# Patient Record
Sex: MALE | Race: WHITE | NOT HISPANIC OR LATINO | Employment: FULL TIME | URBAN - METROPOLITAN AREA
[De-identification: names, ages, dates, MRNs, and addresses within clinical notes are randomized per-mention and may not be internally consistent; named-entity substitution may affect disease eponyms.]

---

## 2022-02-10 ENCOUNTER — TELEPHONE (OUTPATIENT)
Dept: SPORTS MEDICINE | Facility: CLINIC | Age: 37
End: 2022-02-10

## 2022-02-10 ENCOUNTER — HOSPITAL ENCOUNTER (EMERGENCY)
Facility: HOSPITAL | Age: 37
Discharge: HOME | End: 2022-02-10
Attending: EMERGENCY MEDICINE
Payer: COMMERCIAL

## 2022-02-10 ENCOUNTER — TELEPHONE (OUTPATIENT)
Dept: ORTHOPEDICS | Facility: CLINIC | Age: 37
End: 2022-02-10

## 2022-02-10 VITALS
SYSTOLIC BLOOD PRESSURE: 140 MMHG | HEART RATE: 71 BPM | DIASTOLIC BLOOD PRESSURE: 74 MMHG | TEMPERATURE: 98 F | RESPIRATION RATE: 17 BRPM | OXYGEN SATURATION: 99 % | HEIGHT: 68 IN | WEIGHT: 192.56 LBS | BODY MASS INDEX: 29.18 KG/M2

## 2022-02-10 DIAGNOSIS — S62.621B OPEN DISPLACED FRACTURE OF MIDDLE PHALANX OF LEFT INDEX FINGER, INITIAL ENCOUNTER: Primary | ICD-10-CM

## 2022-02-10 PROCEDURE — 90471 IMMUNIZATION ADMIN: CPT | Mod: ER | Performed by: EMERGENCY MEDICINE

## 2022-02-10 PROCEDURE — 12002 RPR S/N/AX/GEN/TRNK2.6-7.5CM: CPT | Mod: ER

## 2022-02-10 PROCEDURE — 25000003 PHARM REV CODE 250: Mod: ER | Performed by: EMERGENCY MEDICINE

## 2022-02-10 PROCEDURE — 63600175 PHARM REV CODE 636 W HCPCS: Mod: ER | Performed by: EMERGENCY MEDICINE

## 2022-02-10 PROCEDURE — 96372 THER/PROPH/DIAG INJ SC/IM: CPT | Mod: ER

## 2022-02-10 PROCEDURE — 90715 TDAP VACCINE 7 YRS/> IM: CPT | Mod: ER | Performed by: EMERGENCY MEDICINE

## 2022-02-10 PROCEDURE — 99284 EMERGENCY DEPT VISIT MOD MDM: CPT | Mod: 25,ER

## 2022-02-10 RX ORDER — LIDOCAINE HYDROCHLORIDE 10 MG/ML
5 INJECTION, SOLUTION EPIDURAL; INFILTRATION; INTRACAUDAL; PERINEURAL
Status: DISCONTINUED | OUTPATIENT
Start: 2022-02-10 | End: 2022-02-10

## 2022-02-10 RX ORDER — LIDOCAINE HYDROCHLORIDE 10 MG/ML
5 INJECTION, SOLUTION EPIDURAL; INFILTRATION; INTRACAUDAL; PERINEURAL
Status: COMPLETED | OUTPATIENT
Start: 2022-02-10 | End: 2022-02-10

## 2022-02-10 RX ORDER — CEFAZOLIN SODIUM 1 G/3ML
2 INJECTION, POWDER, FOR SOLUTION INTRAMUSCULAR; INTRAVENOUS
Status: COMPLETED | OUTPATIENT
Start: 2022-02-10 | End: 2022-02-10

## 2022-02-10 RX ORDER — CEPHALEXIN 500 MG/1
500 CAPSULE ORAL EVERY 6 HOURS
Qty: 28 CAPSULE | Refills: 0 | Status: SHIPPED | OUTPATIENT
Start: 2022-02-10 | End: 2022-02-17

## 2022-02-10 RX ADMIN — TETANUS TOXOID, REDUCED DIPHTHERIA TOXOID AND ACELLULAR PERTUSSIS VACCINE, ADSORBED 0.5 ML: 5; 2.5; 8; 8; 2.5 SUSPENSION INTRAMUSCULAR at 10:02

## 2022-02-10 RX ADMIN — LIDOCAINE HYDROCHLORIDE 50 MG: 10 INJECTION, SOLUTION EPIDURAL; INFILTRATION; INTRACAUDAL at 10:02

## 2022-02-10 RX ADMIN — CEFAZOLIN 2 G: 330 INJECTION, POWDER, FOR SOLUTION INTRAMUSCULAR; INTRAVENOUS at 01:02

## 2022-02-10 RX ADMIN — BACITRACIN ZINC, NEOMYCIN SULFATE, AND POLYMYXIN B SULFATE: 400; 3.5; 5 OINTMENT TOPICAL at 01:02

## 2022-02-10 NOTE — TELEPHONE ENCOUNTER
----- Message from Kat Siddiqui sent at 2/10/2022  2:35 PM CST -----  States he needs to schedule an ER f/u for ortho. He was seen in the ER today. Please call pt [phone]. Thank you

## 2022-02-10 NOTE — TELEPHONE ENCOUNTER
Attempted to schedule patient for office visit for new fracture. Patient stated he already made an appt for tomorrow morning at the [facility]. Informed the patient to send a message or give us a call if he had any further questions. Patient accepted and verbalized understanding.

## 2022-02-10 NOTE — ED PROVIDER NOTES
Encounter Date: 2/10/2022       History     Chief Complaint   Patient presents with    Finger laceration     Left pointer finger pt has lacerated, pt's finger is white, pt states he has feeling, but it feels cold; pt states about 3000lb came down on finger     Patient is a 36-year-old male who presents today with complaints of acute onset left index finger pain.  He states that 3000 lb object fell on to that finger.  He cannot recall the last date of his tetanus vaccine.  He states it is the occurred about 10-15 minutes prior to arrival.  Patient states that his fingertip feels slightly cold, but he denies a loss of sensation.  No prior evaluation.  No prior treatment.        Review of patient's allergies indicates:  No Known Allergies  No past medical history on file.  No past surgical history on file.  No family history on file.     Review of Systems     Constitutional: No fevers, no fatigue  HENT: No headache, no facial pain, no hearing loss  Eyes: No vision changes, no eye pain  Neck/Back: No neck pain, no back pain  Cardiovascular: No chest pain, no palpitations, no syncope  Respiratory: no SOB, no cough  Abdominal: no abdominal pain, no N/V  Genitourinary: no pelvic pain, no genital pain  Musculoskeletal: left index finger pain  Neurological: No numbness, no paresthesias, no weakness, no LOC      Physical Exam     Initial Vitals [02/10/22 1003]   BP Pulse Resp Temp SpO2   133/75 90 16 97.9 °F (36.6 °C) 98 %      MAP       --         Physical Exam   Constitutional: Awake, alert, NAD  HENT: normocephalic, no facial bone tenderness, no evidence of basilar skull fx  Eyes: PERRL, EOM, normal conjunctiva  Neck: Trachea midline, nontender, full ROM  Cardiovascular: RRR, 2+ palpable pulses in all 4 extremities  Pulmonary: Non-labored respirations, equal bilateral breath sounds, LCTAB  Chest Wall: No tenderness, no deformity  Abdominal: Soft, nontender, nondistended  Back: Nontender, no step-offs  Musculoskeletal: U  shaped laceration involving the volar and lateral edges of the left index finger near the PIP joint.  not quite circumferential.  The dorsal aspect of the finger is spared. Bone exposed. Deformity and angulation to the finger.  Patient does have limited extension and flexion of the distal finger.  Capillary refill is 2 seconds in patient does have sensation at the distal finger tip.  Neurological: AAO x4, GCS 15, maintaining airway and answering questions appropriately, no focal deficits  Skin:  Black oil stains to hands bilaterally     ED Course   Lac Repair    Date/Time: 2/10/2022 3:20 PM  Performed by: Ari Rae MD  Authorized by: Ari Rae MD     Consent:     Consent obtained:  Verbal    Consent given by:  Patient    Risks, benefits, and alternatives were discussed: yes      Risks discussed:  Infection, need for additional repair, nerve damage, poor wound healing, poor cosmetic result, pain and tendon damage    Alternatives discussed:  No treatment  Universal protocol:     Procedure explained and questions answered to patient or proxy's satisfaction: yes      Relevant documents present and verified: yes      Patient identity confirmed:  Verbally with patient, arm band and hospital-assigned identification number  Anesthesia:     Anesthesia method:  Nerve block    Block location:  Left index finger    Block needle gauge:  27 G    Block anesthetic:  Lidocaine 1% w/o epi    Block injection procedure:  Anatomic landmarks identified  Laceration details:     Location:  Finger    Finger location:  L index finger    Wound length (cm): 4.  Pre-procedure details:     Preparation:  Patient was prepped and draped in usual sterile fashion  Exploration:     Imaging obtained: x-ray      Imaging outcome: foreign body not noted      Wound exploration: wound explored through full range of motion and entire depth of wound visualized      Wound extent: nerve damage (possible), tendon damage (possible) and underlying  fracture      Wound extent: no foreign bodies/material noted      Tendon damage location:  Upper extremity    Tendon repair plan:  Refer for evaluation    Contaminated: yes    Treatment:     Area cleansed with:  Povidone-iodine, saline and soap and water    Amount of cleaning:  Extensive    Irrigation solution:  Sterile water    Irrigation method:  Pressure wash    Visualized foreign bodies/material removed: no      Debridement:  None    Undermining:  None    Scar revision: no    Skin repair:     Repair method:  Sutures    Suture size:  5-0    Suture material:  Prolene    Suture technique:  Simple interrupted    Number of sutures: 9.  Approximation:     Approximation:  Close  Repair type:     Repair type:  Simple  Post-procedure details:     Dressing:  Antibiotic ointment    Procedure completion:  Tolerated well, no immediate complications      Labs Reviewed - No data to display       Imaging Results          X-Ray Finger 2 or More Views Left (Final result)  Result time 02/10/22 10:44:05    Final result by Shar Mckeon MD (02/10/22 10:44:05)                 Impression:      1.  Comminuted, displaced and angulated 2nd middle phalanx fracture.      Electronically signed by: Shar Mckeon MD  Date:    02/10/2022  Time:    10:44             Narrative:    EXAMINATION:  XR FINGER 2 OR MORE VIEWS LEFT    CLINICAL HISTORY:  crush injury;    TECHNIQUE:  AP, oblique and lateral views of the left 2nd finger    COMPARISON:  None    FINDINGS:  Comminuted and displaced fracture of the 2nd distal phalanx with apex dorsal angulation.  Probable avulsion fracture along the volar and medial proximal interphalangeal joint.    No other osseous or soft tissue abnormalities are seen.                                 Medications   Tdap (BOOSTRIX) vaccine injection 0.5 mL (0.5 mLs Intramuscular Given 2/10/22 1051)   LIDOcaine (PF) 10 mg/ml (1%) injection 50 mg (50 mg Infiltration Given 2/10/22 1057)   ceFAZolin injection 2 g (2 g  Intramuscular Given 2/10/22 1350)   neomycin-bacitracin-polymyxin ointment ( Topical (Top) Given 2/10/22 1352)     Medical Decision Making:   Significant injury with an open fracture to left index finger.  Possible tendon and nerve injury.  Discussed the case with Orthopedics on-call Dr. Contreras who advised that we sutured the wound, reduce it, splint it, and have it follow-up tomorrow morning in orthopedic clinic to discuss surgery for definitive repair.  He advises Keflex as the outpatient antibiotic.    Patient does report having oil stains to bilateral hands.  Difficult to remove all of the oil stains from the hands but wound and finger was cleaned extensively and cleaned as best as possible.  fracture was reduced and splinted.  Ancef and tetanus was given.  Keflex prescription provided and patient is to follow-up with orthopedics tomorrow to discuss surgery                          Clinical Impression:   Final diagnoses:  [O81.354Y] Open displaced fracture of middle phalanx of left index finger, initial encounter (Primary)          ED Disposition Condition    Discharge Stable        ED Prescriptions     Medication Sig Dispense Start Date End Date Auth. Provider    cephALEXin (KEFLEX) 500 MG capsule Take 1 capsule (500 mg total) by mouth every 6 (six) hours. for 7 days 28 capsule 2/10/2022 2/17/2022 Ari Rae MD        Follow-up Information     Follow up With Specialties Details Why Contact [facility]  In 1 day  [address] [place] [address] [address]  [facility] [address]  [phone]      [facility] - Emergency Dept Emergency Medicine  As needed, If symptoms worsen [id number] [facility] [address]  [facility] [address] [phone]           Ari Rae MD  02/10/22 5745

## 2022-02-11 ENCOUNTER — OFFICE VISIT (OUTPATIENT)
Dept: ORTHOPEDICS | Facility: CLINIC | Age: 37
End: 2022-02-11
Payer: COMMERCIAL

## 2022-02-11 ENCOUNTER — ANESTHESIA (OUTPATIENT)
Dept: SURGERY | Facility: HOSPITAL | Age: 37
End: 2022-02-11
Payer: COMMERCIAL

## 2022-02-11 ENCOUNTER — ANESTHESIA EVENT (OUTPATIENT)
Dept: SURGERY | Facility: HOSPITAL | Age: 37
End: 2022-02-11
Payer: COMMERCIAL

## 2022-02-11 ENCOUNTER — HOSPITAL ENCOUNTER (OUTPATIENT)
Facility: HOSPITAL | Age: 37
Discharge: HOME | End: 2022-02-11
Attending: ORTHOPAEDIC SURGERY | Admitting: ORTHOPAEDIC SURGERY
Payer: COMMERCIAL

## 2022-02-11 VITALS
HEART RATE: 88 BPM | TEMPERATURE: 99 F | SYSTOLIC BLOOD PRESSURE: 135 MMHG | WEIGHT: 192.69 LBS | HEIGHT: 68 IN | DIASTOLIC BLOOD PRESSURE: 79 MMHG | BODY MASS INDEX: 29.2 KG/M2

## 2022-02-11 DIAGNOSIS — S62.621D OPEN DISPLACED FRACTURE OF MIDDLE PHALANX OF LEFT INDEX FINGER WITH ROUTINE HEALING, SUBSEQUENT ENCOUNTER: ICD-10-CM

## 2022-02-11 DIAGNOSIS — S62.621B OPEN DISPLACED FRACTURE OF MIDDLE PHALANX OF LEFT INDEX FINGER, INITIAL ENCOUNTER: Primary | ICD-10-CM

## 2022-02-11 LAB
CTP QC/QA: YES
SARS-COV-2 AG RESP QL IA.RAPID: NEGATIVE

## 2022-02-11 PROCEDURE — 71000015 HC POSTOP RECOV 1ST HR: Performed by: ORTHOPAEDIC SURGERY

## 2022-02-11 PROCEDURE — 99999 PR PBB SHADOW E&M-EST. PATIENT-LVL IV: ICD-10-PCS | Mod: PBBFAC,,, | Performed by: ORTHOPAEDIC SURGERY

## 2022-02-11 PROCEDURE — 25000003 PHARM REV CODE 250: Performed by: ORTHOPAEDIC SURGERY

## 2022-02-11 PROCEDURE — 63600175 PHARM REV CODE 636 W HCPCS: Performed by: NURSE ANESTHETIST, CERTIFIED REGISTERED

## 2022-02-11 PROCEDURE — 71000033 HC RECOVERY, INTIAL HOUR: Performed by: ORTHOPAEDIC SURGERY

## 2022-02-11 PROCEDURE — 37000009 HC ANESTHESIA EA ADD 15 MINS: Performed by: ORTHOPAEDIC SURGERY

## 2022-02-11 PROCEDURE — 36000708 HC OR TIME LEV III 1ST 15 MIN: Performed by: ORTHOPAEDIC SURGERY

## 2022-02-11 PROCEDURE — 26735 PR OPEN TX PHALANGEAL SHAFT FRACTURE PROX/MIDDLE EA: ICD-10-PCS | Mod: F1,,, | Performed by: ORTHOPAEDIC SURGERY

## 2022-02-11 PROCEDURE — 99999 PR PBB SHADOW E&M-EST. PATIENT-LVL IV: CPT | Mod: PBBFAC,,, | Performed by: ORTHOPAEDIC SURGERY

## 2022-02-11 PROCEDURE — 26735 TREAT FINGER FRACTURE EACH: CPT | Mod: F1,,, | Performed by: ORTHOPAEDIC SURGERY

## 2022-02-11 PROCEDURE — 36000709 HC OR TIME LEV III EA ADD 15 MIN: Performed by: ORTHOPAEDIC SURGERY

## 2022-02-11 PROCEDURE — 99203 OFFICE O/P NEW LOW 30 MIN: CPT | Mod: 57,S$GLB,, | Performed by: ORTHOPAEDIC SURGERY

## 2022-02-11 PROCEDURE — 37000008 HC ANESTHESIA 1ST 15 MINUTES: Performed by: ORTHOPAEDIC SURGERY

## 2022-02-11 PROCEDURE — 99214 OFFICE O/P EST MOD 30 MIN: CPT | Mod: PBBFAC | Performed by: ORTHOPAEDIC SURGERY

## 2022-02-11 PROCEDURE — 99203 PR OFFICE/OUTPT VISIT, NEW, LEVL III, 30-44 MIN: ICD-10-PCS | Mod: 57,S$GLB,, | Performed by: ORTHOPAEDIC SURGERY

## 2022-02-11 PROCEDURE — C1769 GUIDE WIRE: HCPCS | Performed by: ORTHOPAEDIC SURGERY

## 2022-02-11 DEVICE — WIRE C TROCAR TIP .045: Type: IMPLANTABLE DEVICE | Site: FINGER | Status: FUNCTIONAL

## 2022-02-11 RX ORDER — FENTANYL CITRATE 50 UG/ML
INJECTION, SOLUTION INTRAMUSCULAR; INTRAVENOUS
Status: DISCONTINUED | OUTPATIENT
Start: 2022-02-11 | End: 2022-02-12

## 2022-02-11 RX ORDER — OXYCODONE AND ACETAMINOPHEN 5; 325 MG/1; MG/1
1 TABLET ORAL
Status: DISCONTINUED | OUTPATIENT
Start: 2022-02-11 | End: 2022-02-11 | Stop reason: HOSPADM

## 2022-02-11 RX ORDER — MIDAZOLAM HYDROCHLORIDE 1 MG/ML
INJECTION, SOLUTION INTRAMUSCULAR; INTRAVENOUS
Status: DISCONTINUED | OUTPATIENT
Start: 2022-02-11 | End: 2022-02-12

## 2022-02-11 RX ORDER — HYDROMORPHONE HYDROCHLORIDE 2 MG/ML
0.2 INJECTION, SOLUTION INTRAMUSCULAR; INTRAVENOUS; SUBCUTANEOUS EVERY 5 MIN PRN
Status: DISCONTINUED | OUTPATIENT
Start: 2022-02-11 | End: 2022-02-11 | Stop reason: HOSPADM

## 2022-02-11 RX ORDER — BUPIVACAINE HYDROCHLORIDE 2.5 MG/ML
INJECTION, SOLUTION EPIDURAL; INFILTRATION; INTRACAUDAL
Status: DISCONTINUED | OUTPATIENT
Start: 2022-02-11 | End: 2022-02-11 | Stop reason: HOSPADM

## 2022-02-11 RX ORDER — KETOROLAC TROMETHAMINE 30 MG/ML
15 INJECTION, SOLUTION INTRAMUSCULAR; INTRAVENOUS EVERY 8 HOURS PRN
Status: DISCONTINUED | OUTPATIENT
Start: 2022-02-11 | End: 2022-02-11 | Stop reason: HOSPADM

## 2022-02-11 RX ORDER — PROPOFOL 10 MG/ML
VIAL (ML) INTRAVENOUS
Status: DISCONTINUED | OUTPATIENT
Start: 2022-02-11 | End: 2022-02-12

## 2022-02-11 RX ORDER — CEFAZOLIN SODIUM 1 G/3ML
INJECTION, POWDER, FOR SOLUTION INTRAMUSCULAR; INTRAVENOUS
Status: DISCONTINUED | OUTPATIENT
Start: 2022-02-11 | End: 2022-02-12

## 2022-02-11 RX ORDER — HYDROCODONE BITARTRATE AND ACETAMINOPHEN 10; 325 MG/1; MG/1
1 TABLET ORAL EVERY 4 HOURS PRN
Qty: 30 TABLET | Refills: 0 | Status: SHIPPED | OUTPATIENT
Start: 2022-02-11 | End: 2022-02-16 | Stop reason: ALTCHOICE

## 2022-02-11 RX ORDER — ONDANSETRON 2 MG/ML
4 INJECTION INTRAMUSCULAR; INTRAVENOUS DAILY PRN
Status: DISCONTINUED | OUTPATIENT
Start: 2022-02-11 | End: 2022-02-11 | Stop reason: HOSPADM

## 2022-02-11 RX ADMIN — PROPOFOL 50 MCG/KG/MIN: 10 INJECTION, EMULSION INTRAVENOUS at 02:02

## 2022-02-11 RX ADMIN — FENTANYL CITRATE 25 MCG: 50 INJECTION, SOLUTION INTRAMUSCULAR; INTRAVENOUS at 02:02

## 2022-02-11 RX ADMIN — CEFAZOLIN 2 G: 1 INJECTION, POWDER, FOR SOLUTION INTRAMUSCULAR; INTRAVENOUS at 03:02

## 2022-02-11 RX ADMIN — SODIUM CHLORIDE, POTASSIUM CHLORIDE, SODIUM LACTATE AND CALCIUM CHLORIDE: 600; 310; 30; 20 INJECTION, SOLUTION INTRAVENOUS at 02:02

## 2022-02-11 RX ADMIN — MIDAZOLAM 2 MG: 1 INJECTION INTRAMUSCULAR; INTRAVENOUS at 02:02

## 2022-02-11 RX ADMIN — PROPOFOL 50 MG: 10 INJECTION, EMULSION INTRAVENOUS at 02:02

## 2022-02-11 NOTE — DISCHARGE SUMMARY
[facility])  Orthopedics  Discharge Summary      Patient Name: Say Mann  MRN: [id number]  Admission Date: 2/11/2022  Hospital Length of Stay: 0 days  Discharge Date and Time:  02/11/2022 3:54 PM  Attending Physician: Jorje Contreras MD   Discharging Provider: Duane Art PA-C  Primary Care Provider: Primary Doctor No    HPI: Say Mann is a 37 yo male with left index finger middle phalanx open fracture    Procedure(s) (LRB):  ORIF, FINGER (Left)      Hospital Course: Patient underwent CRPP and wound debridement today        Significant Diagnostic Studies: No pertinent studies.    Pending Diagnostic Studies:     Procedure Component Value Units Date/Time    X-Ray Finger 2 or More Views Left [[id number]] Resulted: 02/11/22 1554    Order Status: Sent Lab Status: In process Updated: 02/11/22 1554        There are no hospital problems to display for this patient.     Discharged Condition: good    Disposition: Home or Self Care    Follow Up: Ortho trauma clinic 2/16/22    Patient Instructions:   No discharge procedures on file.  Medications:  Reconciled Home Medications:      Medication List      CONTINUE taking these medications    cephALEXin 500 MG capsule  Commonly known as: KEFLEX  Take 1 capsule (500 mg total) by mouth every 6 (six) hours. for 7 days     HYDROcodone-acetaminophen  mg per tablet  Commonly known as: NORCO  Take 1 tablet by mouth every 4 (four) hours as needed for Pain.            Duane Art PA-C  Orthopedics  [facility]

## 2022-02-11 NOTE — OP NOTE
[facility] - Surgery (Hospital)  Operative Note      Date of Procedure: 2/11/2022     Procedure: Procedure(s) (LRB):  ORIF, FINGER (Left)     Surgeon(s) and Role:     * Jorje Contreras MD - Primary    Assisting Surgeon: Duane Art PA-C    Pre-Operative Diagnosis: Open displaced fracture of middle phalanx of left index finger    Post-Operative Diagnosis:  Open displaced fracture of middle phalanx of left index finger    Anesthesia: Local MAC    Operative Findings (including complications, if any):  Open fracture of the left index middle phalanx with gross instability.  Intact flexor profundus tendon.  Near circumferential laceration.  No complications    Description of Technical Procedures:  The patient is brought to the operating room and after administration of adequate intravenous sedation the left hand was prepped around the index MCP joint with isopropyl alcohol.  7 cc of 0.25% Marcaine was then used to administer a metacarpal block.  Once adequate anesthesia had been obtained with combination of intravenous medication and the local medication the left hand and distal forearm were prepped and draped in usual fashion for hand exposure.  Sutures at the index finger had been removed prior to prepping and draping.  There was a gaping wound across the volar aspect of the finger at the level of the middle phalanx extending almost circumferentially.  The flexor profundus tendon was identified in the depths of the wound and was intact.  The wound was copiously irrigated with saline solution.  Traumatized skin margins were excised sharply.  The fracture fragments could be palpated and were reduced with manual traction and using C-arm fluoroscopic guidance to 0.45 K-wires were used to stabilize the fracture with pinning.  The 1st pins placed and was found to lie in the volar aspect just adjacent to the condyles of the proximal phalanx.  Second pin was inserted into the shaft of the proximal phalanx.  This gave good  alignment with no malrotation.  The pins were then cut and capped.  The laceration was repaired with interrupted 3-0 nylon.  Sterile padded bandage was applied the patient was brought to the recovery room in stable condition    Significant Surgical Tasks Conducted by the Assistant(s), if Applicable:  Assistance with manipulation of the finger while undergoing open reduction with pin fixation    Estimated Blood Loss (EBL):  1 cc           Implants:   Implant Name Type Inv. Item Serial No.  Lot No. LRB No. Used Action   WIRE C TROCAR TIP .045 - [id number]  WIRE C TROCAR TIP .045  [employer] [id number] Left 2 Implanted       Specimens:  None            Condition: Good    Disposition: PACU - hemodynamically stable.    Attestation: I performed the procedure.    Discharge Note    OUTCOME: Patient tolerated treatment/procedure well without complication and is now ready for discharge.    DISPOSITION: Home or Self Care    FINAL DIAGNOSIS:  Open fracture of the left index middle phalanx    FOLLOWUP: In clinic on Wednesday February 16    DISCHARGE INSTRUCTIONS:  Keep bandage clean and dry.  Finish course of Keflex antibiotics.  Hydrocodone if needed for pain     Clinical Reference Documents Added to Patient Instructions       Document    FINGER FRACTURE DISCHARGE INSTRUCTIONS (ENGLISH)    HYDROCODONE AND ACETAMINOPHEN, ADULT (ENGLISH)

## 2022-02-11 NOTE — PLAN OF CARE
Patient prepped for surgery; patient verbalized understanding of preop procedures; belongings given to family/secured; patient had no further questions; will continue to update patient while in preop and make safety rounds; OR called regarding laterality of procedure.

## 2022-02-11 NOTE — OP NOTE
Certification of Assistant at Surgery       Surgery Date: 2/11/2022     Participating Surgeons:  Surgeon(s) and Role:     * Jorje Contreras MD - Primary    Procedures:  Procedure(s) (LRB):  ORIF, FINGER (Left)    Assistant Surgeon's Certification of Necessity:  I understand that section 1842 (b) (6) (d) of the Social Security Act generally prohibits Medicare Part B reasonable charge payment for the services of assistants at surgery in teaching hospitals when qualified residents are available to furnish such services. I certify that the services for which payment is claimed were medically necessary, and that no qualified resident was available to perform the services. I further understand that these services are subject to post-payment review by the Medicare carrier.      Duane Art PA-C    02/11/2022  3:53 PM

## 2022-02-11 NOTE — PROGRESS NOTES
"Subjective:     Patient ID: Say Mann is a 36 y.o. male.    Chief Complaint: Pain of the Left Hand      HPI:  The patient is in today with his wife for follow-up from the emergency room.  He sustained a crush injury to the left non dominant index finger and the course of his work yesterday.  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  He was seen in the emergency room, x-rays revealed a middle phalanx displaced fracture.  Open wound was cleansed and sutured.  Given Keflex.  Now here for orthopedic care.  Complains of pain at the injury site.  There is a little bit of tingling at the very tip of the finger but sensation is mainly intact.  Other fingers were not injured but he does have pain at the tip of the middle finger.    History reviewed. No pertinent past medical history.  History reviewed. No pertinent surgical history.  Family History   Problem Relation Age of Onset    Heart disease Maternal Grandfather      Social History     Socioeconomic History    Marital status:    Tobacco Use    Smoking status: Current Every Day Smoker     Types: Vaping with nicotine    Smokeless tobacco: Never Used   Substance and Sexual Activity    Alcohol use: Never    Drug use: Never     Medication List with Changes/Refills   Current Medications    CEPHALEXIN (KEFLEX) 500 MG CAPSULE    Take 1 capsule (500 mg total) by mouth every 6 (six) hours. for 7 days     Review of patient's allergies indicates:  No Known Allergies  ROS     Objective:   Body mass index is 29.3 kg/m².  Vitals:    02/11/22 0849   BP: 135/79   Pulse: 88   Temp: 98.8 °F (37.1 °C)   Weight: 87.4 kg (192 lb 10.9 oz)   Height: 5' 8" (1.727 m)   PainSc:   4   PainLoc: Hand       PHYSICAL EXAM:  Well-developed well-nourished male in no acute distress.  He is awake, alert, oriented, cooperative with evaluation.    Examination of the left index finger reveals a near circumferential laceration that has been sutured.  There is " swelling of the finger from the proximal phalanx level out to the tip, predominantly at the PIP joint level.  There is no gross deformity.  Range of motion is guarded due to pain.  There is minimal motion of the MCP and D IP joints.  No motion of the PIP joint.  No drainage, no evidence of infection.    X-rays of the left hand are reviewed from February 10, 2022.  There is a comminuted fracture of the middle phalanx of the left index finger.  There is dorsal displacement of the proximal fragments per overall there are several fragments.        Open displaced fracture of middle phalanx of left index finger, initial encounter        Plan:  The patient is an open fracture of the left index middle phalanx.  There was comminution, displacement.  Recommend that we go to surgery today for further debridement, reduction, percutaneous pinning.  Discussed risk of pin infections.  Also discussed stiffness, poor healing, nonhealing, expected recovery time of at least 4 months.    Plan office follow-up on February 16     Injury at work with worker's compensation.        Jorje Contreras MD, FAAOS [facility], Orthopedic Trauma Service  [place]

## 2022-02-11 NOTE — ANESTHESIA PREPROCEDURE EVALUATION
02/11/2022  Say Mann is a 36 y.o., male.  Patient Active Problem List   Diagnosis    Open displaced fracture of middle phalanx of left index finger     No past surgical history on file.    Anesthesia Evaluation    I have reviewed the Patient Summary Reports.    I have reviewed the Nursing Notes. I have reviewed the NPO Status.   I have reviewed the Medications.     Review of Systems  Anesthesia Hx:  No problems with previous Anesthesia    Social:  Smoker    Hematology/Oncology:  Hematology Normal        Cardiovascular:  Cardiovascular Normal     Pulmonary:  Pulmonary Normal    Renal/:  Renal/ Normal     Hepatic/GI:  Hepatic/GI Normal    Musculoskeletal:   Open displaced fracture of middle phalanx of left index finger   Neurological:  Neurology Normal    Endocrine:  Endocrine Normal        Physical Exam  General:  Well nourished    Airway/Jaw/Neck:  Airway Findings: Mouth Opening: Normal Tongue: Normal  General Airway Assessment: Adult  Mallampati: I  TM Distance: 4 - 6 cm  Jaw/Neck Findings:  Neck ROM: Normal ROM      Dental:  Dental Findings: In tact   Chest/Lungs:  Chest/Lungs Findings: Clear to auscultation, Normal Respiratory Rate     Heart/Vascular:  Heart Findings: Rate: Normal  Rhythm: Regular Rhythm  Sounds: Normal        Mental Status:  Mental Status Findings:  Cooperative, Alert and Oriented         Anesthesia Plan  Type of Anesthesia, risks & benefits discussed:  Anesthesia Type:  general    Patient's Preference:   Plan Factors:          Intra-op Monitoring Plan: standard ASA monitors  Intra-op Monitoring Plan Comments:   Post Op Pain Control Plan: multimodal analgesia and per primary service following discharge from PACU  Post Op Pain Control Plan Comments:     Induction:   IV  Beta Blocker:  Patient is not currently on a Beta-Blocker (No further documentation required).       Informed  Consent: Patient understands risks and agrees with Anesthesia plan.  Questions answered. Anesthesia consent signed with patient.  ASA Score: 2     Day of Surgery Review of History & Physical:  There are no significant changes.          Ready For Surgery From Anesthesia Perspective.       Chemistry    No results found for: NA, K, CL, CO2, BUN, CREATININE, GLU No results found for: CALCIUM, ALKPHOS, AST, ALT, BILITOT, ESTGFRAFRICA, EGFRNONAA     No results found for: WBC, HGB, HCT, MCV, PLT

## 2022-02-11 NOTE — H&P (VIEW-ONLY)
"Subjective:     Patient ID: Say Mann is a 36 y.o. male.    Chief Complaint: Pain of the Left Hand      HPI:  The patient is in today with his wife for follow-up from the emergency room.  He sustained a crush injury to the left non dominant index finger and the course of his work yesterday.  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  He was seen in the emergency room, x-rays revealed a middle phalanx displaced fracture.  Open wound was cleansed and sutured.  Given Keflex.  Now here for orthopedic care.  Complains of pain at the injury site.  There is a little bit of tingling at the very tip of the finger but sensation is mainly intact.  Other fingers were not injured but he does have pain at the tip of the middle finger.    History reviewed. No pertinent past medical history.  History reviewed. No pertinent surgical history.  Family History   Problem Relation Age of Onset    Heart disease Maternal Grandfather      Social History     Socioeconomic History    Marital status:    Tobacco Use    Smoking status: Current Every Day Smoker     Types: Vaping with nicotine    Smokeless tobacco: Never Used   Substance and Sexual Activity    Alcohol use: Never    Drug use: Never     Medication List with Changes/Refills   Current Medications    CEPHALEXIN (KEFLEX) 500 MG CAPSULE    Take 1 capsule (500 mg total) by mouth every 6 (six) hours. for 7 days     Review of patient's allergies indicates:  No Known Allergies  ROS     Objective:   Body mass index is 29.3 kg/m².  Vitals:    02/11/22 0849   BP: 135/79   Pulse: 88   Temp: 98.8 °F (37.1 °C)   Weight: 87.4 kg (192 lb 10.9 oz)   Height: 5' 8" (1.727 m)   PainSc:   4   PainLoc: Hand       PHYSICAL EXAM:  Well-developed well-nourished male in no acute distress.  He is awake, alert, oriented, cooperative with evaluation.    Examination of the left index finger reveals a near circumferential laceration that has been sutured.  There is " swelling of the finger from the proximal phalanx level out to the tip, predominantly at the PIP joint level.  There is no gross deformity.  Range of motion is guarded due to pain.  There is minimal motion of the MCP and D IP joints.  No motion of the PIP joint.  No drainage, no evidence of infection.    X-rays of the left hand are reviewed from February 10, 2022.  There is a comminuted fracture of the middle phalanx of the left index finger.  There is dorsal displacement of the proximal fragments per overall there are several fragments.        Open displaced fracture of middle phalanx of left index finger, initial encounter        Plan:  The patient is an open fracture of the left index middle phalanx.  There was comminution, displacement.  Recommend that we go to surgery today for further debridement, reduction, percutaneous pinning.  Discussed risk of pin infections.  Also discussed stiffness, poor healing, nonhealing, expected recovery time of at least 4 months.    Plan office follow-up on February 16     Injury at work with worker's compensation.        Jorje Contreras MD, FAAOS [facility], Orthopedic Trauma Service  [place]

## 2022-02-11 NOTE — INTERVAL H&P NOTE
The patient has been examined and the H&P has been reviewed:    I concur with the findings and no changes have occurred since H&P was written.

## 2022-02-12 NOTE — TRANSFER OF CARE
"Anesthesia Transfer of Care Note    Patient: Say Mann    Procedure(s) Performed: Procedure(s) (LRB):  ORIF, FINGER (Left)    Patient location: PACU    Anesthesia Type: MAC    Transport from OR: Transported from OR on room air with adequate spontaneous ventilation    Post pain: adequate analgesia    Post assessment: no apparent anesthetic complications    Post vital signs: stable    Level of consciousness: awake and alert    Nausea/Vomiting: no nausea/vomiting    Complications: none          Last vitals:   Visit Vitals  BP (!) 147/70   Pulse 72   Temp 36.1 °C (97 °F) (Temporal)   Resp (!) 24   Ht 5' 8" (1.727 m)   Wt 83.7 kg (184 lb 6.6 oz)   SpO2 99%   BMI 28.04 kg/m²     "

## 2022-02-12 NOTE — ANESTHESIA POSTPROCEDURE EVALUATION
Anesthesia Post Evaluation    Patient: Say Mann    Procedure(s) Performed: Procedure(s) (LRB):  ORIF, FINGER (Left)    Final Anesthesia Type: MAC      Patient location during evaluation: PACU  Patient participation: Yes- Able to Participate  Level of consciousness: awake and alert, oriented and awake  Post-procedure vital signs: reviewed and stable  Pain management: adequate  Airway patency: patent    PONV status at discharge: No PONV  Anesthetic complications: no      Cardiovascular status: blood pressure returned to baseline  Respiratory status: unassisted, spontaneous ventilation and room air  Hydration status: euvolemic  Follow-up not needed.          Vitals Value Taken Time   /70 02/11/22 1610   Temp 36.1 °C (97 °F) 02/11/22 1610   Pulse 74 02/11/22 1613   Resp 19 02/11/22 1613   SpO2 97 % 02/11/22 1613   Vitals shown include unvalidated device data.      Event Time   Out of Recovery 16:15:16         Pain/Celi Score: Celi Score: 10 (2/11/2022  4:04 PM)

## 2022-02-14 ENCOUNTER — TELEPHONE (OUTPATIENT)
Dept: ORTHOPEDICS | Facility: CLINIC | Age: 37
End: 2022-02-14

## 2022-02-14 NOTE — TELEPHONE ENCOUNTER
Returned call to [facility]/ [employer] [facility] and left a message for her to call back.

## 2022-02-14 NOTE — TELEPHONE ENCOUNTER
----- Message from Lisandra Mccormack sent at 2/14/2022 12:09 PM CST -----  Contact: [phone] @ [employer]  Good Afternoon  Patient need talk to office due to patient    Please call and advise

## 2022-02-15 VITALS
TEMPERATURE: 97 F | BODY MASS INDEX: 27.95 KG/M2 | OXYGEN SATURATION: 99 % | HEIGHT: 68 IN | SYSTOLIC BLOOD PRESSURE: 147 MMHG | HEART RATE: 72 BPM | DIASTOLIC BLOOD PRESSURE: 70 MMHG | WEIGHT: 184.44 LBS | RESPIRATION RATE: 24 BRPM

## 2022-02-16 ENCOUNTER — OFFICE VISIT (OUTPATIENT)
Dept: ORTHOPEDICS | Facility: CLINIC | Age: 37
End: 2022-02-16
Payer: COMMERCIAL

## 2022-02-16 VITALS
BODY MASS INDEX: 27.96 KG/M2 | WEIGHT: 184.5 LBS | SYSTOLIC BLOOD PRESSURE: 133 MMHG | HEART RATE: 80 BPM | DIASTOLIC BLOOD PRESSURE: 70 MMHG | TEMPERATURE: 99 F | HEIGHT: 68 IN

## 2022-02-16 DIAGNOSIS — S62.621D OPEN DISPLACED FRACTURE OF MIDDLE PHALANX OF LEFT INDEX FINGER WITH ROUTINE HEALING, SUBSEQUENT ENCOUNTER: Primary | ICD-10-CM

## 2022-02-16 PROCEDURE — 99214 OFFICE O/P EST MOD 30 MIN: CPT | Mod: PBBFAC | Performed by: ORTHOPAEDIC SURGERY

## 2022-02-16 PROCEDURE — 99999 PR PBB SHADOW E&M-EST. PATIENT-LVL IV: ICD-10-PCS | Mod: PBBFAC,,, | Performed by: ORTHOPAEDIC SURGERY

## 2022-02-16 PROCEDURE — 99024 POSTOP FOLLOW-UP VISIT: CPT | Mod: ,,, | Performed by: ORTHOPAEDIC SURGERY

## 2022-02-16 PROCEDURE — 99999 PR PBB SHADOW E&M-EST. PATIENT-LVL IV: CPT | Mod: PBBFAC,,, | Performed by: ORTHOPAEDIC SURGERY

## 2022-02-16 PROCEDURE — 99024 PR POST-OP FOLLOW-UP VISIT: ICD-10-PCS | Mod: ,,, | Performed by: ORTHOPAEDIC SURGERY

## 2022-02-16 RX ORDER — HYDROCODONE BITARTRATE AND ACETAMINOPHEN 5; 325 MG/1; MG/1
1 TABLET ORAL EVERY 6 HOURS PRN
Qty: 20 TABLET | Refills: 0 | Status: SHIPPED | OUTPATIENT
Start: 2022-02-16 | End: 2022-02-16

## 2022-02-16 RX ORDER — HYDROCODONE BITARTRATE AND ACETAMINOPHEN 5; 325 MG/1; MG/1
1 TABLET ORAL EVERY 6 HOURS PRN
Qty: 20 TABLET | Refills: 0 | Status: SHIPPED | OUTPATIENT
Start: 2022-02-16 | End: 2022-02-16 | Stop reason: CLARIF

## 2022-02-16 RX ORDER — HYDROCODONE BITARTRATE AND ACETAMINOPHEN 5; 325 MG/1; MG/1
1 TABLET ORAL EVERY 6 HOURS PRN
Qty: 20 TABLET | Refills: 0 | Status: SHIPPED | OUTPATIENT
Start: 2022-02-16

## 2022-02-16 NOTE — PATIENT INSTRUCTIONS
Keep bandage clean and dry.  May return to light duty work tomorrow.  Return on February 21st for dressing change and wound check.

## 2022-02-16 NOTE — PROGRESS NOTES
Subjective:     Patient ID: Say Mann is a 36 y.o. male.    Chief Complaint: Post-op Evaluation and Pain of the Left Hand      HPI:  The patient is in accompanied by his wife.  He is now 5 days postop ORIF of the left index middle phalanx open fracture.  Pain is decreasing.  Notes tingling at the finger tip.  Still on Keflex.  Taking hydrocodone 4 times a day.  Wants to return to light duty tomorrow    History reviewed. No pertinent past medical history.  Past Surgical History:   Procedure Laterality Date    OPEN REDUCTION AND INTERNAL FIXATION (ORIF) OF INJURY OF FINGER Left 2/11/2022    Procedure: ORIF, FINGER;  Surgeon: Jorje Contreras MD;  Location: [facility];  Service: Orthopedics;  Laterality: Left;  Left index finger middle phalanx open fracture, debridement, pinning     Family History   Problem Relation Age of Onset    Heart disease Maternal Grandfather      Social History     Socioeconomic History    Marital status:    Tobacco Use    Smoking status: Current Every Day Smoker     Types: Vaping with nicotine    Smokeless tobacco: Never Used   Substance and Sexual Activity    Alcohol use: Never    Drug use: Never     Medication List with Changes/Refills   New Medications    HYDROCODONE-ACETAMINOPHEN (NORCO) 5-325 MG PER TABLET    Take 1 tablet by mouth every 6 (six) hours as needed for Pain.   Current Medications    CEPHALEXIN (KEFLEX) 500 MG CAPSULE    Take 1 capsule (500 mg total) by mouth every 6 (six) hours. for 7 days   Discontinued Medications    HYDROCODONE-ACETAMINOPHEN (NORCO)  MG PER TABLET    Take 1 tablet by mouth every 4 (four) hours as needed for Pain.    HYDROCODONE-ACETAMINOPHEN (NORCO)  MG PER TABLET    Take 1 tablet by mouth every 4 hours as needed for pain     Review of patient's allergies indicates:  No Known Allergies  ROS     Objective:   Body mass index is 28.06 kg/m².  Vitals:    02/16/22 1103   BP: 133/70   Pulse: 80   Temp: 99.2 °F (37.3 °C)   Weight: 83.7  "kg (184 lb 8.4 oz)   Height: 5' 8" (1.727 m)   PainSc:   3   PainLoc: Hand       PHYSICAL EXAM:  Well-developed well-nourished male in no acute distress.  He is awake, alert, oriented, cooperative with evaluation.    Examination left index finger reveals the laceration to be suture.  No evidence of infection or hematoma.  The pin sites are clean and dry.  There is tingling with palpation of the ulnar and radial aspects of the index finger tip, more prominent radially.  The finger is well aligned.    Open displaced fracture of middle phalanx of left index finger with routine healing, subsequent encounter  -     Discontinue: HYDROcodone-acetaminophen (NORCO) 5-325 mg per tablet; Take 1 tablet by mouth every 6 (six) hours as needed for Pain.  Dispense: 20 tablet; Refill: 0  -     Discontinue: HYDROcodone-acetaminophen (NORCO) 5-325 mg per tablet; Take 1 tablet by mouth every 6 (six) hours as needed for Pain.  Dispense: 20 tablet; Refill: 0  -     Discontinue: HYDROcodone-acetaminophen (NORCO) 5-325 mg per tablet; Take 1 tablet by mouth every 6 (six) hours as needed for Pain.  Dispense: 20 tablet; Refill: 0  -     Discontinue: HYDROcodone-acetaminophen (NORCO) 5-325 mg per tablet; Take 1 tablet by mouth every 6 (six) hours as needed for Pain.  Dispense: 20 tablet; Refill: 0  -     HYDROcodone-acetaminophen (NORCO) 5-325 mg per tablet; Take 1 tablet by mouth every 6 (six) hours as needed for Pain.  Dispense: 20 tablet; Refill: 0        Plan:  The patient is recovering satisfactorily.  The finger is read bandaged and splinted.  Return in 5 days for dressing change, wound check.  Finish the Keflex.  Hydrocodone prescribed if needed.    Patient Instructions   Keep bandage clean and dry.  May return to light duty work tomorrow.  Return on February 21st for dressing change and wound check.             Jorje Contreras MD, FAAOS [facility], Orthopedic Trauma Service  [place]    "

## 2022-02-21 ENCOUNTER — OFFICE VISIT (OUTPATIENT)
Dept: ORTHOPEDICS | Facility: CLINIC | Age: 37
End: 2022-02-21
Payer: COMMERCIAL

## 2022-02-21 VITALS
WEIGHT: 184.5 LBS | TEMPERATURE: 99 F | SYSTOLIC BLOOD PRESSURE: 108 MMHG | BODY MASS INDEX: 27.33 KG/M2 | HEIGHT: 69 IN | HEART RATE: 71 BPM | DIASTOLIC BLOOD PRESSURE: 68 MMHG

## 2022-02-21 DIAGNOSIS — S62.621D OPEN DISPLACED FRACTURE OF MIDDLE PHALANX OF LEFT INDEX FINGER WITH ROUTINE HEALING, SUBSEQUENT ENCOUNTER: Primary | ICD-10-CM

## 2022-02-21 PROCEDURE — 99024 PR POST-OP FOLLOW-UP VISIT: ICD-10-PCS | Mod: ,,, | Performed by: PHYSICIAN ASSISTANT

## 2022-02-21 PROCEDURE — 99024 POSTOP FOLLOW-UP VISIT: CPT | Mod: ,,, | Performed by: PHYSICIAN ASSISTANT

## 2022-02-21 PROCEDURE — 99999 PR PBB SHADOW E&M-EST. PATIENT-LVL IV: CPT | Mod: PBBFAC,,, | Performed by: PHYSICIAN ASSISTANT

## 2022-02-21 PROCEDURE — 99214 OFFICE O/P EST MOD 30 MIN: CPT | Mod: PBBFAC | Performed by: PHYSICIAN ASSISTANT

## 2022-02-21 PROCEDURE — 99999 PR PBB SHADOW E&M-EST. PATIENT-LVL IV: ICD-10-PCS | Mod: PBBFAC,,, | Performed by: PHYSICIAN ASSISTANT

## 2022-02-21 NOTE — PROGRESS NOTES
"Subjective:      Patient ID: Say Mann is a 36 y.o. male.    Chief Complaint: Post-op Evaluation and Pain of the Left Hand      HPI: Say Mann is a 36-year-old male in clinic today for postoperative follow-up.  Patient is 10 days status post ORIF with pinning of the left index finger middle phalanx for open fracture.  Patient is doing well this time.  He reports pain is decreasing.  Patient reports continued tingling at the fingertips.  Patient has been compliant with antibiotics.  He reports pain control has been satisfactory and he has pain pills left to take as needed    History reviewed. No pertinent past medical history.    Current Outpatient Medications:     HYDROcodone-acetaminophen (NORCO) 5-325 mg per tablet, Take 1 tablet by mouth every 6 (six) hours as needed for Pain., Disp: 20 tablet, Rfl: 0  Review of patient's allergies indicates:  No Known Allergies    /68 (BP Location: Right arm, Patient Position: Sitting, BP Method: Medium (Automatic))   Pulse 71   Temp 99.2 °F (37.3 °C)   Ht 5' 9" (1.753 m)   Wt 83.7 kg (184 lb 8.4 oz)   BMI 27.25 kg/m²     ROS      Objective:    Ortho Exam   Left index finger:  Sutures intact, wound margins well approximated, no signs of infection  Two pins intact and pin sites are clean and dry  Mild edema  Mild TTP  Sensation is present, but decreased over the radial and ulnar aspects of the index finger tip  Pulses intact    GEN: Well developed, well nourished male. AAOX3. No acute distress.   Normocephalic, atraumatic.   ELADIA  Breathing unlabored.  Mood and affect appropriate.        Assessment:     Imaging:  No new imaging ordered today        1. Open displaced fracture of middle phalanx of left index finger with routine healing, subsequent encounter          Plan:       Wound and pin sites were cleaned with hydrogen peroxide and dried.  Dressed with Xeroform and 4x4s under aluminum splint then wrapped in Coban.  Patient will return to clinic in 4 days for " possible suture removal     Follow up in about 4 days (around 2/25/2022).

## 2022-02-25 ENCOUNTER — OFFICE VISIT (OUTPATIENT)
Dept: ORTHOPEDICS | Facility: CLINIC | Age: 37
End: 2022-02-25
Payer: COMMERCIAL

## 2022-02-25 ENCOUNTER — HOSPITAL ENCOUNTER (OUTPATIENT)
Dept: RADIOLOGY | Facility: HOSPITAL | Age: 37
Discharge: HOME | End: 2022-02-25
Attending: ORTHOPAEDIC SURGERY
Payer: COMMERCIAL

## 2022-02-25 VITALS
DIASTOLIC BLOOD PRESSURE: 76 MMHG | BODY MASS INDEX: 27.25 KG/M2 | SYSTOLIC BLOOD PRESSURE: 131 MMHG | HEIGHT: 69 IN | HEART RATE: 82 BPM | WEIGHT: 184 LBS

## 2022-02-25 DIAGNOSIS — M79.645 FINGER PAIN, LEFT: ICD-10-CM

## 2022-02-25 DIAGNOSIS — M79.645 FINGER PAIN, LEFT: Primary | ICD-10-CM

## 2022-02-25 DIAGNOSIS — M79.642 LEFT HAND PAIN: Primary | ICD-10-CM

## 2022-02-25 PROCEDURE — 73140 XR FINGER 2 OR MORE VIEWS LEFT: ICD-10-PCS | Mod: 26,LT,, | Performed by: RADIOLOGY

## 2022-02-25 PROCEDURE — 73140 X-RAY EXAM OF FINGER(S): CPT | Mod: TC,LT

## 2022-02-25 PROCEDURE — 99024 PR POST-OP FOLLOW-UP VISIT: ICD-10-PCS | Mod: ,,, | Performed by: PHYSICIAN ASSISTANT

## 2022-02-25 PROCEDURE — 99024 POSTOP FOLLOW-UP VISIT: CPT | Mod: ,,, | Performed by: PHYSICIAN ASSISTANT

## 2022-02-25 PROCEDURE — 99213 OFFICE O/P EST LOW 20 MIN: CPT | Mod: PBBFAC | Performed by: PHYSICIAN ASSISTANT

## 2022-02-25 PROCEDURE — 73140 X-RAY EXAM OF FINGER(S): CPT | Mod: 26,LT,, | Performed by: RADIOLOGY

## 2022-02-25 PROCEDURE — 99999 PR PBB SHADOW E&M-EST. PATIENT-LVL III: CPT | Mod: PBBFAC,,, | Performed by: PHYSICIAN ASSISTANT

## 2022-02-25 PROCEDURE — 99999 PR PBB SHADOW E&M-EST. PATIENT-LVL III: ICD-10-PCS | Mod: PBBFAC,,, | Performed by: PHYSICIAN ASSISTANT

## 2022-02-28 ENCOUNTER — TELEPHONE (OUTPATIENT)
Dept: ORTHOPEDICS | Facility: CLINIC | Age: 37
End: 2022-02-28

## 2022-02-28 NOTE — PROGRESS NOTES
"Subjective:      Patient ID: Say Mann is a 36 y.o. male.    Chief Complaint: Pain, Post-op Evaluation, and Injury of the Left Hand      HPI: Say Mann is a 36-year-old male in clinic today for postoperative follow-up. Patient is 14 days status post ORIF with pinning of the left index finger middle phalanx for open fracture.  Patient is doing well this time.  He reports pain is decreasing.  Patient reports continued tingling at the fingertips.  Patient has been compliant with antibiotics.  He reports pain control has been satisfactory and he has pain pills left to take as needed    History reviewed. No pertinent past medical history.    Current Outpatient Medications:     HYDROcodone-acetaminophen (NORCO) 5-325 mg per tablet, Take 1 tablet by mouth every 6 (six) hours as needed for Pain. (Patient not taking: Reported on 2/25/2022), Disp: 20 tablet, Rfl: 0  Review of patient's allergies indicates:  No Known Allergies    /76   Pulse 82   Ht 5' 9" (1.753 m)   Wt 83.5 kg (184 lb)   BMI 27.17 kg/m²     ROS      Objective:    Ortho Exam   Left index finger:  Sutures intact, wound margins healing well, no signs of infection  Two pins intact and pin sites are clean and dry  Mild edema  Mild TTP  Sensation is present, but decreased over the radial and ulnar aspects of the index finger tip  Pulses intact     GEN: Well developed, well nourished male. AAOX3. No acute distress.   Normocephalic, atraumatic.   ELADIA  Breathing unlabored.  Mood and affect appropriate.        Assessment:     Imaging: Xray left index finger obtained today shows K-wires across the middle phalanx fracture and PIP joint        1. Left hand pain          Plan:       Sutures removed, patient instructed on normal wound care with soap and water. Patient should continue to keep the pin sites clean and dry, patient may clean them with alcohol. Return to clinic in 2 weeks for wound check. Notify us or return sooner if symptoms acutely worsen   "   Follow up in about 2 weeks (around 3/11/2022).

## 2022-02-28 NOTE — TELEPHONE ENCOUNTER
Spoke with Zoey/ [facility] [employer] and she will contact [employer] to see what is needed. Understanding verbalized.

## 2022-02-28 NOTE — TELEPHONE ENCOUNTER
----- Message from Celia Gonzalez sent at 2/28/2022  3:19 PM CST -----  Contact: workers comp  Narcisa is calling in regards to follow up on last visit office notes from 02/25. Please upload information to [email or web address] claim  # [id number] date on injury 02/10/2022.              Thanks  DD

## 2022-03-07 DIAGNOSIS — M79.642 PAIN OF LEFT HAND: Primary | ICD-10-CM

## 2022-03-11 ENCOUNTER — OFFICE VISIT (OUTPATIENT)
Dept: ORTHOPEDICS | Facility: CLINIC | Age: 37
End: 2022-03-11
Payer: COMMERCIAL

## 2022-03-11 ENCOUNTER — HOSPITAL ENCOUNTER (OUTPATIENT)
Dept: RADIOLOGY | Facility: HOSPITAL | Age: 37
Discharge: HOME | End: 2022-03-11
Attending: PHYSICIAN ASSISTANT
Payer: COMMERCIAL

## 2022-03-11 VITALS
SYSTOLIC BLOOD PRESSURE: 121 MMHG | DIASTOLIC BLOOD PRESSURE: 73 MMHG | HEIGHT: 69 IN | TEMPERATURE: 99 F | HEART RATE: 68 BPM | WEIGHT: 184.06 LBS | BODY MASS INDEX: 27.26 KG/M2

## 2022-03-11 DIAGNOSIS — M79.642 PAIN OF LEFT HAND: ICD-10-CM

## 2022-03-11 DIAGNOSIS — S62.621D OPEN DISPLACED FRACTURE OF MIDDLE PHALANX OF LEFT INDEX FINGER WITH ROUTINE HEALING, SUBSEQUENT ENCOUNTER: Primary | ICD-10-CM

## 2022-03-11 PROCEDURE — 99024 PR POST-OP FOLLOW-UP VISIT: ICD-10-PCS | Mod: S$GLB,,, | Performed by: PHYSICIAN ASSISTANT

## 2022-03-11 PROCEDURE — 99024 POSTOP FOLLOW-UP VISIT: CPT | Mod: S$GLB,,, | Performed by: PHYSICIAN ASSISTANT

## 2022-03-11 PROCEDURE — 99999 PR PBB SHADOW E&M-EST. PATIENT-LVL III: CPT | Mod: PBBFAC,,, | Performed by: PHYSICIAN ASSISTANT

## 2022-03-11 PROCEDURE — 99999 PR PBB SHADOW E&M-EST. PATIENT-LVL III: ICD-10-PCS | Mod: PBBFAC,,, | Performed by: PHYSICIAN ASSISTANT

## 2022-03-11 PROCEDURE — 73130 XR HAND COMPLETE 3 VIEW LEFT: ICD-10-PCS | Mod: 26,LT,, | Performed by: RADIOLOGY

## 2022-03-11 PROCEDURE — 73130 X-RAY EXAM OF HAND: CPT | Mod: TC,LT

## 2022-03-11 PROCEDURE — 73130 X-RAY EXAM OF HAND: CPT | Mod: 26,LT,, | Performed by: RADIOLOGY

## 2022-03-11 NOTE — PROGRESS NOTES
"Subjective:      Patient ID: Say Mann is a 36 y.o. male.    Chief Complaint: Post-op Evaluation and Pain of the Left Hand      HPI: Say Mann is a 36-year-old male in clinic today for postoperative follow-up.  Patient is 4 weeks status post ORIF with pinning of the left index finger middle phalanx for open fracture.  Patient is doing well at this time.  Pain control has been satisfactory.  Patient still reports tingling at the fingertips.  Patient finished all antibiotics and has no signs of infection.  He reports that the wound is healing well    History reviewed. No pertinent past medical history.    Current Outpatient Medications:     HYDROcodone-acetaminophen (NORCO) 5-325 mg per tablet, Take 1 tablet by mouth every 6 (six) hours as needed for Pain. (Patient not taking: No sig reported), Disp: 20 tablet, Rfl: 0  Review of patient's allergies indicates:  No Known Allergies    /73 (BP Location: Right arm, Patient Position: Sitting, BP Method: Medium (Automatic))   Pulse 68   Temp 99.1 °F (37.3 °C)   Ht 5' 9" (1.753 m)   Wt 83.5 kg (184 lb 1.4 oz)   BMI 27.18 kg/m²     ROS      Objective:    Ortho Exam   Left index finger:  Incision well healed, no signs of infection  Two pins intact and pin sites are clean and dry  Mild edema  No TTP  Sensation is present, but decreased over the radial and ulnar aspects of the finger tip  Pulses intact    GEN: Well developed, well nourished male. AAOX3. No acute distress.   Normocephalic, atraumatic.   ELADIA  Breathing unlabored.  Mood and affect appropriate.        Assessment:     Imaging:  X-ray left hand obtained today shows fracture in similar alignment to previous films with 2 pins still intact        1. Open displaced fracture of middle phalanx of left index finger with routine healing, subsequent encounter          Plan:       Patient progressing as expected postoperatively.  Report we will leave the pins in for 2 more weeks and recheck another x-ray, " consider pin removal at that time.  Patient should continue to keep the pin sites clean and dry, he may cleaned them once daily with alcohol     Follow up in about 2 weeks (around 3/25/2022).

## 2022-03-14 ENCOUNTER — TELEPHONE (OUTPATIENT)
Dept: ORTHOPEDICS | Facility: CLINIC | Age: 37
End: 2022-03-14
Payer: COMMERCIAL

## 2022-03-14 NOTE — TELEPHONE ENCOUNTER
----- Message from Juana Renteria sent at 3/14/2022 11:33 AM CDT -----  Contact: Joe/ [employer] is needing a call back regarding the patient return to work status. Please call back at [phone]. Reference Number [id number]

## 2022-03-15 DIAGNOSIS — M79.642 PAIN OF LEFT HAND: Primary | ICD-10-CM

## 2022-03-16 ENCOUNTER — TELEPHONE (OUTPATIENT)
Dept: ORTHOPEDICS | Facility: CLINIC | Age: 37
End: 2022-03-16
Payer: COMMERCIAL

## 2022-03-16 NOTE — TELEPHONE ENCOUNTER
----- Message from Kristel Hart sent at 3/16/2022  1:29 PM CDT -----  Contact: [employer]/ [employer]   Celia would like a call back at [phone], in regards to the work status of the patient.

## 2022-03-16 NOTE — TELEPHONE ENCOUNTER
Spoke with [employer]/ [employer] and informed her that we did receive the forms and that they will be completed and faxed back to her. Understanding verbalized.

## 2022-03-28 ENCOUNTER — HOSPITAL ENCOUNTER (OUTPATIENT)
Dept: RADIOLOGY | Facility: HOSPITAL | Age: 37
Discharge: HOME | End: 2022-03-28
Attending: PHYSICIAN ASSISTANT
Payer: COMMERCIAL

## 2022-03-28 ENCOUNTER — OFFICE VISIT (OUTPATIENT)
Dept: ORTHOPEDICS | Facility: CLINIC | Age: 37
End: 2022-03-28
Payer: COMMERCIAL

## 2022-03-28 VITALS
TEMPERATURE: 98 F | HEIGHT: 69 IN | SYSTOLIC BLOOD PRESSURE: 137 MMHG | DIASTOLIC BLOOD PRESSURE: 76 MMHG | WEIGHT: 184.06 LBS | BODY MASS INDEX: 27.26 KG/M2 | HEART RATE: 73 BPM

## 2022-03-28 DIAGNOSIS — S62.621D OPEN DISPLACED FRACTURE OF MIDDLE PHALANX OF LEFT INDEX FINGER WITH ROUTINE HEALING, SUBSEQUENT ENCOUNTER: Primary | ICD-10-CM

## 2022-03-28 DIAGNOSIS — M79.642 PAIN OF LEFT HAND: ICD-10-CM

## 2022-03-28 DIAGNOSIS — M79.645 PAIN OF FINGER OF LEFT HAND: Primary | ICD-10-CM

## 2022-03-28 PROCEDURE — 99213 OFFICE O/P EST LOW 20 MIN: CPT | Mod: PBBFAC | Performed by: STUDENT IN AN ORGANIZED HEALTH CARE EDUCATION/TRAINING PROGRAM

## 2022-03-28 PROCEDURE — 99024 POSTOP FOLLOW-UP VISIT: CPT | Mod: ,,, | Performed by: STUDENT IN AN ORGANIZED HEALTH CARE EDUCATION/TRAINING PROGRAM

## 2022-03-28 PROCEDURE — 73130 X-RAY EXAM OF HAND: CPT | Mod: TC,LT

## 2022-03-28 PROCEDURE — 99024 PR POST-OP FOLLOW-UP VISIT: ICD-10-PCS | Mod: ,,, | Performed by: STUDENT IN AN ORGANIZED HEALTH CARE EDUCATION/TRAINING PROGRAM

## 2022-03-28 PROCEDURE — 99999 PR PBB SHADOW E&M-EST. PATIENT-LVL III: ICD-10-PCS | Mod: PBBFAC,,, | Performed by: STUDENT IN AN ORGANIZED HEALTH CARE EDUCATION/TRAINING PROGRAM

## 2022-03-28 PROCEDURE — 73130 X-RAY EXAM OF HAND: CPT | Mod: 26,LT,, | Performed by: RADIOLOGY

## 2022-03-28 PROCEDURE — 99999 PR PBB SHADOW E&M-EST. PATIENT-LVL III: CPT | Mod: PBBFAC,,, | Performed by: STUDENT IN AN ORGANIZED HEALTH CARE EDUCATION/TRAINING PROGRAM

## 2022-03-28 PROCEDURE — 73130 XR HAND COMPLETE 3 VIEW LEFT: ICD-10-PCS | Mod: 26,LT,, | Performed by: RADIOLOGY

## 2022-03-28 NOTE — PATIENT INSTRUCTIONS
Please work on range of motion exercises of the left hand with occupational therapy.  We will see back in 6 weeks to check range of motion.  Patient is still on light duty for work until he has full range of motion of his hand.  We will re-evaluate in 6 weeks.

## 2022-03-28 NOTE — PROGRESS NOTES
"Subjective:      Patient ID: Say Mann is a 36 y.o. male.    Chief Complaint: Post-op Evaluation and Pain of the Left Hand      HPI:  Patient is a 36-year-old right-hand-dominant male who sustained a left index finger middle phalanx open fracture that was pinned by Dr. Ben thomas 6 weeks ago.  Patient is here for pin removal and repeat examination.  Patient states that his pain is controlled denies any fevers or chills or drainage from the pin sites.  His wife presents with him at today's visit.  No other questions at today's visit.    History reviewed. No pertinent past medical history.    Current Outpatient Medications:     HYDROcodone-acetaminophen (NORCO) 5-325 mg per tablet, Take 1 tablet by mouth every 6 (six) hours as needed for Pain., Disp: 20 tablet, Rfl: 0  Review of patient's allergies indicates:  No Known Allergies    /76 (BP Location: Right arm, Patient Position: Sitting, BP Method: Medium (Automatic))   Pulse 73   Temp 98.3 °F (36.8 °C)   Ht 5' 9" (1.753 m)   Wt 83.5 kg (184 lb 1.4 oz)   BMI 27.18 kg/m²     Review of Systems   All other systems reviewed and are negative.        Objective:    Left Hand Exam     Comments:  Pin sites are clean dry and intact with no drainage.  Does not appear infected.  He does have evidence of a mallet deformity of his distal phalanx which is secondary to the nature of the direction of the pin placement.  Patient does have mild swelling to the middle phalanx.  He does have decreased sensation at the finger tip.  This appears to be unchanged from his previous examination.  He does have good cap refill distally.               GEN: Well developed, well nourished male. AAOX3. No acute distress.   Normocephalic, atraumatic.   ELADIA  Breathing unlabored.  Mood and affect normal.        Assessment:     Imaging:  Radiographs demonstrate interval healing with no evidence of fracture displacement.  He does have mild volar translation of the middle phalanx compared " to the proximal phalanx which is unchanged from previous radiographs.        1. Open displaced fracture of middle phalanx of left index finger with routine healing, subsequent encounter          Plan:       Patient is a 36-year-old male who is status post above-mentioned procedure.  The patient is doing well in the immediate postoperative period.  The patient will be sent to occupational therapy to work on range of motion exercises.  He can lift nothing more than a coffee mug at this point time.  He will follow back up in 6 weeks for a range-of-motion check and for repeat radiographs.  Patient will receive a work note which will likely release him to full duties if he has improved with range of motion exercises with occupational therapy.  Pins were removed at today's visit.  All questions answered.       Follow up in about 6 weeks (around 5/9/2022).          Patient note was created using MModal Dictation.  Any errors in syntax or even information may not have been identified and edited on initial review prior to signing this note.

## 2022-05-09 ENCOUNTER — HOSPITAL ENCOUNTER (OUTPATIENT)
Dept: RADIOLOGY | Facility: HOSPITAL | Age: 37
Discharge: HOME | End: 2022-05-09
Attending: STUDENT IN AN ORGANIZED HEALTH CARE EDUCATION/TRAINING PROGRAM

## 2022-05-09 ENCOUNTER — OFFICE VISIT (OUTPATIENT)
Dept: ORTHOPEDICS | Facility: CLINIC | Age: 37
End: 2022-05-09
Payer: COMMERCIAL

## 2022-05-09 VITALS
HEIGHT: 69 IN | HEART RATE: 74 BPM | WEIGHT: 184 LBS | BODY MASS INDEX: 27.25 KG/M2 | SYSTOLIC BLOOD PRESSURE: 123 MMHG | DIASTOLIC BLOOD PRESSURE: 77 MMHG

## 2022-05-09 DIAGNOSIS — M79.645 PAIN OF FINGER OF LEFT HAND: Primary | ICD-10-CM

## 2022-05-09 DIAGNOSIS — M79.645 PAIN OF FINGER OF LEFT HAND: ICD-10-CM

## 2022-05-09 DIAGNOSIS — S62.621D OPEN DISPLACED FRACTURE OF MIDDLE PHALANX OF LEFT INDEX FINGER WITH ROUTINE HEALING, SUBSEQUENT ENCOUNTER: Primary | ICD-10-CM

## 2022-05-09 PROCEDURE — 99024 POSTOP FOLLOW-UP VISIT: CPT | Mod: ,,, | Performed by: STUDENT IN AN ORGANIZED HEALTH CARE EDUCATION/TRAINING PROGRAM

## 2022-05-09 PROCEDURE — 99213 OFFICE O/P EST LOW 20 MIN: CPT | Mod: PBBFAC | Performed by: STUDENT IN AN ORGANIZED HEALTH CARE EDUCATION/TRAINING PROGRAM

## 2022-05-09 PROCEDURE — 99999 PR PBB SHADOW E&M-EST. PATIENT-LVL III: CPT | Mod: PBBFAC,,, | Performed by: STUDENT IN AN ORGANIZED HEALTH CARE EDUCATION/TRAINING PROGRAM

## 2022-05-09 PROCEDURE — 73140 X-RAY EXAM OF FINGER(S): CPT | Mod: 26,F1,, | Performed by: RADIOLOGY

## 2022-05-09 PROCEDURE — 99999 PR PBB SHADOW E&M-EST. PATIENT-LVL III: ICD-10-PCS | Mod: PBBFAC,,, | Performed by: STUDENT IN AN ORGANIZED HEALTH CARE EDUCATION/TRAINING PROGRAM

## 2022-05-09 PROCEDURE — 73140 XR FINGER 2 OR MORE VIEWS: ICD-10-PCS | Mod: 26,F1,, | Performed by: RADIOLOGY

## 2022-05-09 PROCEDURE — 99024 PR POST-OP FOLLOW-UP VISIT: ICD-10-PCS | Mod: ,,, | Performed by: STUDENT IN AN ORGANIZED HEALTH CARE EDUCATION/TRAINING PROGRAM

## 2022-05-09 PROCEDURE — 73140 X-RAY EXAM OF FINGER(S): CPT | Mod: TC

## 2022-05-09 NOTE — PATIENT INSTRUCTIONS
Please continue working with occupational therapy for the next 6 weeks.  You can be weight-bearing as tolerated to the left hand in a gradual manner.  Please work with therapy for range of motion and strengthening exercises.  We will see you back in clinic in 6 weeks for repeat radiographs and to check your range of motion.  We will likely provide a work release note at that point in time.

## 2022-05-09 NOTE — PROGRESS NOTES
"Subjective:      Patient ID: Say Mann is a 36 y.o. male.    Chief Complaint: No chief complaint on file.      HPI: Patient is a 36-year-old right-hand-dominant male who sustained a left index finger middle phalanx open fracture that was pinned by Dr. Contreras back on 11 Feb 2022.  Patient is here for repeat examination and ROM check.  Patient states that his motion is slightly improving and has only worked with OT for the past 4 weeks.  States that pain is better. His wife presents with him at today's visit.  No other questions at today's visit.    No past medical history on file.    Current Outpatient Medications:     HYDROcodone-acetaminophen (NORCO) 5-325 mg per tablet, Take 1 tablet by mouth every 6 (six) hours as needed for Pain., Disp: 20 tablet, Rfl: 0  Review of patient's allergies indicates:  No Known Allergies    /77   Pulse 74   Ht 5' 9" (1.753 m)   Wt 83.5 kg (184 lb)   BMI 27.17 kg/m²     Review of Systems   All other systems reviewed and are negative.        Objective:    Left Hand Exam     Comments:  Pin sites are well-healed.  Does not appear infected.  He does have evidence of a mallet deformity of his distal phalanx which is secondary to the nature of the direction of the pin placement.  Patient does have mild swelling to the middle phalanx which is improved from last visit.  He does have decreased sensation at the finger tip.  This appears to be unchanged from his previous examination.  He does have good cap refill distally.               GEN: Well developed, well nourished male. AAOX3. No acute distress.   Normocephalic, atraumatic.   ELADIA  Breathing unlabored.  Mood and affect normal.        Assessment:     Imaging: Radiographs demonstrate interval healing with no evidence of fracture displacement.  He does have persistent mild volar translation of the middle phalanx compared to the proximal phalanx which is unchanged from previous radiographs.        1. Open displaced fracture of " middle phalanx of left index finger with routine healing, subsequent encounter          Plan:       Patient is a 36-year-old male who is status post above-mentioned procedure.  The patient is doing better but has not fully regained his ROM with inability to make a closed fist.  The patient will continue to work with occupational therapy on range of motion exercises.  He can be WBAT at this point and time.  He will follow back up in 6 weeks for a range-of-motion check and for repeat radiographs.  Patient will receive a work note which will likely release him to full duties if he has improved with range of motion exercises with occupational therapy. All questions answered.       Follow up in about 6 weeks (around 6/20/2022) for repeat radiographs of the left index finger and ROM check.          Patient note was created using [employer] Dictation.  Any errors in syntax or even information may not have been identified and edited on initial review prior to signing this note.

## 2022-05-20 ENCOUNTER — TELEPHONE (OUTPATIENT)
Dept: ORTHOPEDICS | Facility: CLINIC | Age: 37
End: 2022-05-20
Payer: COMMERCIAL

## 2022-05-20 NOTE — TELEPHONE ENCOUNTER
Returned call to Neema/ [employer] and left a message to call back with information on what exactly is needed.

## 2022-05-20 NOTE — TELEPHONE ENCOUNTER
----- Message from Quiana Worley sent at 5/20/2022  8:17 AM CDT -----  Ms. Bethea from [employer] would like a call back regarding clinical orders from the pt's last office visit. Please call back at [phone]. The fax number is [phone] Attn: [id number]

## 2022-06-17 ENCOUNTER — OFFICE VISIT (OUTPATIENT)
Dept: ORTHOPEDICS | Facility: CLINIC | Age: 37
End: 2022-06-17

## 2022-06-17 ENCOUNTER — TELEPHONE (OUTPATIENT)
Dept: ORTHOPEDICS | Facility: CLINIC | Age: 37
End: 2022-06-17
Payer: COMMERCIAL

## 2022-06-17 ENCOUNTER — HOSPITAL ENCOUNTER (OUTPATIENT)
Dept: RADIOLOGY | Facility: HOSPITAL | Age: 37
Discharge: HOME | End: 2022-06-17
Attending: STUDENT IN AN ORGANIZED HEALTH CARE EDUCATION/TRAINING PROGRAM

## 2022-06-17 VITALS
SYSTOLIC BLOOD PRESSURE: 134 MMHG | HEIGHT: 69 IN | BODY MASS INDEX: 27.26 KG/M2 | WEIGHT: 184.06 LBS | HEART RATE: 68 BPM | DIASTOLIC BLOOD PRESSURE: 73 MMHG

## 2022-06-17 DIAGNOSIS — S62.621D OPEN DISPLACED FRACTURE OF MIDDLE PHALANX OF LEFT INDEX FINGER WITH ROUTINE HEALING, SUBSEQUENT ENCOUNTER: Primary | ICD-10-CM

## 2022-06-17 DIAGNOSIS — M79.645 PAIN OF FINGER OF LEFT HAND: ICD-10-CM

## 2022-06-17 DIAGNOSIS — M20.019 MALLET DEFORMITY OF INDEX FINGER: ICD-10-CM

## 2022-06-17 PROCEDURE — 99213 OFFICE O/P EST LOW 20 MIN: CPT | Mod: S$PBB,,, | Performed by: ORTHOPAEDIC SURGERY

## 2022-06-17 PROCEDURE — 99999 PR PBB SHADOW E&M-EST. PATIENT-LVL III: CPT | Mod: PBBFAC,,, | Performed by: ORTHOPAEDIC SURGERY

## 2022-06-17 PROCEDURE — 99999 PR PBB SHADOW E&M-EST. PATIENT-LVL III: ICD-10-PCS | Mod: PBBFAC,,, | Performed by: ORTHOPAEDIC SURGERY

## 2022-06-17 PROCEDURE — 73140 X-RAY EXAM OF FINGER(S): CPT | Mod: TC

## 2022-06-17 PROCEDURE — 99213 PR OFFICE/OUTPT VISIT, EST, LEVL III, 20-29 MIN: ICD-10-PCS | Mod: S$PBB,,, | Performed by: ORTHOPAEDIC SURGERY

## 2022-06-17 PROCEDURE — 73140 XR FINGER 2 OR MORE VIEWS: ICD-10-PCS | Mod: 26,LT,, | Performed by: RADIOLOGY

## 2022-06-17 PROCEDURE — 99213 OFFICE O/P EST LOW 20 MIN: CPT | Mod: PBBFAC | Performed by: ORTHOPAEDIC SURGERY

## 2022-06-17 PROCEDURE — 73140 X-RAY EXAM OF FINGER(S): CPT | Mod: 26,LT,, | Performed by: RADIOLOGY

## 2022-06-17 NOTE — TELEPHONE ENCOUNTER
----- Message from Nasreen Romano sent at 6/17/2022  2:17 PM CDT -----  Contact: [employer] [facility]/ [employer]ce[employer] Bethea needs a call back at [phone] with claim# [id number] and or fax it to [phone](attn: [email or web address]), Regards to his work status and clinical notes for todays 6/17/22 visit.    Thanks  Td

## 2022-06-17 NOTE — TELEPHONE ENCOUNTER
Spoke with Neema/ [employer] and informed her of patient's work status and that his information would be faxed. Understanding verbalized.

## 2022-06-17 NOTE — PATIENT INSTRUCTIONS
Left index finger middle phalanx open fracture of February 10th.    Proximal interphalangeal joint breaking down, likely developing posttraumatic arthritis    Continue occupational for left index finger.  Continue light duty work.  Return in 2 weeks to evaluate progress.

## 2022-06-17 NOTE — PROGRESS NOTES
"Subjective:     Patient ID: Say Mann is a 36 y.o. male.    Chief Complaint: Pain of the Left Hand      HPI:  The patient returns for follow-up of his left index finger open fracture of February 10, 2022 and surgical repair on February 11, 2022. Accompanied by his wife.  He has been going to occupational therapy in [place].  He says he has been slow return of motion.  The finger remains enlarged.  There is no pain.  He is working at light duty.  He has difficulty with grasping and fine manipulation.  He complains that it is in the way at times.    History reviewed. No pertinent past medical history.  Past Surgical History:   Procedure Laterality Date    OPEN REDUCTION AND INTERNAL FIXATION (ORIF) OF INJURY OF FINGER Left 2/11/2022    Procedure: ORIF, FINGER;  Surgeon: Jorje Contreras MD;  [facility];  Service: Orthopedics;  Laterality: Left;  Left index finger middle phalanx open fracture, debridement, pinning     Family History   Problem Relation Age of Onset    Heart disease Maternal Grandfather      Social History     Socioeconomic History    Marital status:    Tobacco Use    Smoking status: Current Every Day Smoker     Types: Vaping with nicotine    Smokeless tobacco: Never Used   Substance and Sexual Activity    Alcohol use: Never    Drug use: Never     Medication List with Changes/Refills   Current Medications    HYDROCODONE-ACETAMINOPHEN (NORCO) 5-325 MG PER TABLET    Take 1 tablet by mouth every 6 (six) hours as needed for Pain.     Review of patient's allergies indicates:  No Known Allergies  ROS     Objective:   Body mass index is 27.18 kg/m².  Vitals:    06/17/22 0857   BP: 134/73   Pulse: 68   Weight: 83.5 kg (184 lb 1.4 oz)   Height: 5' 9" (1.753 m)   PainSc: 0-No pain   PainLoc: Hand       PHYSICAL EXAM:  Well-developed well-nourished male no acute distress.  Awake, alert, oriented, cooperative with evaluation.    Examination of the left index finger reveals that the finger " is enlarged, particularly at the PIP joint.  No tenderness.  With attempts at making a fist the index tip fails the distal palmar crease by less than a cm. MCP motion is 0° to 100°, PIP motion 0° to 90°.  D IP with loss of active extensor power and active motion 45° to 55°.    X-rays of the left index finger are reviewed with the patient.  Reviewed his films from the time of injury through today.  There is breakdown of the middle phalanx joint surface at the PIP level with fragmentation.  The joint is satisfactory aligned.    Open displaced fracture of middle phalanx of left index finger with routine healing, subsequent encounter    Mallet deformity of index finger        Plan:  The patient is recovering from open fracture of the left index finger.  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  Also has developed mallet deformity at the D IP joint with loss of extensor power.  I recommend he continue with therapy for another 2 weeks to work on improving motion and grasping.  Follow-up at that point.  Continue with light duty work.    Patient Instructions   Left index finger middle phalanx open fracture of February 10th.    Proximal interphalangeal joint breaking down, likely developing posttraumatic arthritis    Continue occupational for left index finger.  Continue light duty work.  Return in 2 weeks to evaluate progress.             Jorje Contreras MD, FAAOS [facility], Orthopedic Trauma Service  [place]

## 2022-06-21 ENCOUNTER — TELEPHONE (OUTPATIENT)
Dept: ORTHOPEDICS | Facility: CLINIC | Age: 37
End: 2022-06-21
Payer: COMMERCIAL

## 2022-06-21 DIAGNOSIS — M20.019 MALLET DEFORMITY OF INDEX FINGER: ICD-10-CM

## 2022-06-21 DIAGNOSIS — S62.621D OPEN DISPLACED FRACTURE OF MIDDLE PHALANX OF LEFT INDEX FINGER WITH ROUTINE HEALING, SUBSEQUENT ENCOUNTER: Primary | ICD-10-CM

## 2022-06-21 NOTE — TELEPHONE ENCOUNTER
----- Message from Enedelia Evans MD sent at 6/21/2022  9:45 AM CDT -----  Regarding: RE: FCE Order  Just entered the order.  ----- Message -----  From: Ailyn Velazquez MA  Sent: 6/21/2022   9:25 AM CDT  To: Enedelia Evans MD  Subject: FCE Order                                        Please enter order for FCE

## 2022-06-21 NOTE — TELEPHONE ENCOUNTER
Reviewed patient's last OT note.  Patient would like to return to full duty; however, I agree with the therapist's recommendation for a FCE prior to release back to full duty.

## 2022-06-30 ENCOUNTER — TELEPHONE (OUTPATIENT)
Dept: ORTHOPEDICS | Facility: CLINIC | Age: 37
End: 2022-06-30
Payer: COMMERCIAL

## 2022-06-30 NOTE — TELEPHONE ENCOUNTER
----- Message from Patty Metzger sent at 6/30/2022 11:36 AM CDT -----  Contact: [phone]  Contact: Neema Province/ [employer] Worker Concepción Bethea needs a call back at [phone] with claim# [id number] and or fax it to [phone](attn: [id number]), Regards to his work status and clinical notes for todays 6/17/22 visit.      Neema is leaving today at 4 and won't be back until 07/11/22.

## 2022-07-01 ENCOUNTER — OFFICE VISIT (OUTPATIENT)
Dept: ORTHOPEDICS | Facility: CLINIC | Age: 37
End: 2022-07-01
Payer: COMMERCIAL

## 2022-07-01 VITALS
BODY MASS INDEX: 27.26 KG/M2 | WEIGHT: 184.06 LBS | HEART RATE: 78 BPM | DIASTOLIC BLOOD PRESSURE: 85 MMHG | HEIGHT: 69 IN | SYSTOLIC BLOOD PRESSURE: 143 MMHG

## 2022-07-01 DIAGNOSIS — M20.019 MALLET DEFORMITY OF INDEX FINGER: ICD-10-CM

## 2022-07-01 DIAGNOSIS — S62.621D OPEN DISPLACED FRACTURE OF MIDDLE PHALANX OF LEFT INDEX FINGER WITH ROUTINE HEALING, SUBSEQUENT ENCOUNTER: Primary | ICD-10-CM

## 2022-07-01 PROCEDURE — 99999 PR PBB SHADOW E&M-EST. PATIENT-LVL III: CPT | Mod: PBBFAC,,, | Performed by: ORTHOPAEDIC SURGERY

## 2022-07-01 PROCEDURE — 99999 PR PBB SHADOW E&M-EST. PATIENT-LVL III: ICD-10-PCS | Mod: PBBFAC,,, | Performed by: ORTHOPAEDIC SURGERY

## 2022-07-01 PROCEDURE — 99024 POSTOP FOLLOW-UP VISIT: CPT | Mod: S$GLB,,, | Performed by: ORTHOPAEDIC SURGERY

## 2022-07-01 PROCEDURE — 99024 PR POST-OP FOLLOW-UP VISIT: ICD-10-PCS | Mod: S$GLB,,, | Performed by: ORTHOPAEDIC SURGERY

## 2022-07-01 NOTE — PATIENT INSTRUCTIONS
Continue with therapy for left index finger injury.  Continue with light duty work activities.    Await functional capacities evaluation.  Return here after that has been completed.

## 2022-07-01 NOTE — PROGRESS NOTES
"Subjective:     Patient ID: Say Mann is a 36 y.o. male.    Chief Complaint: Pain of the Left Hand      HPI:  The patient returns for follow-up accompanied by his wife.  He is now nearing 5 months post injury and surgery for left index finger open fracture.  He continues with therapy.  Reports increased tightness in the finger after perhaps over working it in the therapy clinic this past week.  Caused some swelling.  There is still weakness.  He is working at light duty.  Awaits functional capacities evaluation    History reviewed. No pertinent past medical history.  Past Surgical History:   Procedure Laterality Date    OPEN REDUCTION AND INTERNAL FIXATION (ORIF) OF INJURY OF FINGER Left 2/11/2022    Procedure: ORIF, FINGER;  Surgeon: Jorje Contreras MD;  Location: [facility];  Service: Orthopedics;  Laterality: Left;  Left index finger middle phalanx open fracture, debridement, pinning     Family History   Problem Relation Age of Onset    Heart disease Maternal Grandfather      Social History     Socioeconomic History    Marital status:    Tobacco Use    Smoking status: Current Every Day Smoker     Types: Vaping with nicotine    Smokeless tobacco: Never Used   Substance and Sexual Activity    Alcohol use: Never    Drug use: Never     Medication List with Changes/Refills   Current Medications    HYDROCODONE-ACETAMINOPHEN (NORCO) 5-325 MG PER TABLET    Take 1 tablet by mouth every 6 (six) hours as needed for Pain.     Review of patient's allergies indicates:  No Known Allergies  ROS     Objective:   Body mass index is 27.18 kg/m².  Vitals:    07/01/22 0854   BP: (!) 143/85   Pulse: 78   Weight: 83.5 kg (184 lb 1.4 oz)   Height: 5' 9" (1.753 m)   PainSc:   1   PainLoc: Hand       PHYSICAL EXAM:  Well-developed well-nourished male no acute distress.  Awake, alert, oriented, cooperative with evaluation.    Examination of the left index finger reveals enlarged and slightly tender PIP joint.  MCP was 0° to " 90° range of motion.  PIP was 0° to 90° range of motion.  D IP with 10° to 60° range of motion.  There is intact flexor power but complete loss of extensor power with the mallet deformity.    Reports tingling to touch from the PIP joint distally, more prominent on the dorsal than volar surface    Open displaced fracture of middle phalanx of left index finger with routine healing, subsequent encounter    Mallet deformity of index finger        Plan:  The patient is nearing a plateau in his recovery.  Discussed potential need for permanent job duty change.  Await the results of the FCE and return for follow-up after that.  Continue therapy.    Patient Instructions   Continue with therapy for left index finger injury.  Continue with light duty work activities.    Await functional capacities evaluation.  Return here after that has been completed.             Jorje Contreras MD, FAAOS [facility], Orthopedic Trauma Service  [place]

## 2025-07-25 NOTE — PLAN OF CARE
Addended by: PINA DEXTER on: 7/25/2025 11:12 AM     Modules accepted: Level of Service     Pt resting on stretcher, denies pain at present. VSS. Will cont to monitor. See flow sheet for detailed assessment.

## (undated) DEVICE — NDL SAFETY 25G X 1.5 ECLIPSE

## (undated) DEVICE — COVER LIGHT HANDLE 80/CA

## (undated) DEVICE — SEE MEDLINE ITEM 157117

## (undated) DEVICE — COVER PIN STEINMAN YELLOW

## (undated) DEVICE — DRESSING GAUZE XEROFORM 5X9

## (undated) DEVICE — DRAPE PLASTIC U 60X72

## (undated) DEVICE — GOWN SURG 2XL DISP TIE BACK

## (undated) DEVICE — SUT ETHILON 3-0 PS2 18 BLK

## (undated) DEVICE — GLOVE BIOGEL PI ORTHO PRO 7.5

## (undated) DEVICE — GAUZE SPONGE 4X4 12PLY

## (undated) DEVICE — SYR 10CC LUER LOCK

## (undated) DEVICE — TOWEL OR DISP STRL BLUE 4/PK

## (undated) DEVICE — SEE MEDLINE ITEM 157173

## (undated) DEVICE — PAD ABD 8X10 STERILE

## (undated) DEVICE — BANDAGE ESMARK ELASTIC ST 4X9

## (undated) DEVICE — BANDAGE MATRIX HK LOOP 3IN 5YD

## (undated) DEVICE — SUPPORT ULNA NERVE PROTECTOR

## (undated) DEVICE — DRESSING XEROFORM FOIL PK 1X8

## (undated) DEVICE — ALCOHOL 70% ISOP RUBBING 4OZ

## (undated) DEVICE — UNDERGLOVES BIOGEL PI SIZE 8

## (undated) DEVICE — SEE MEDLINE ITEM 157027

## (undated) DEVICE — PAD CAST 2 IN X 4YDS STERILE

## (undated) DEVICE — APPLICATOR CHLORAPREP ORN 26ML